# Patient Record
Sex: FEMALE | Race: WHITE | Employment: UNEMPLOYED | URBAN - METROPOLITAN AREA
[De-identification: names, ages, dates, MRNs, and addresses within clinical notes are randomized per-mention and may not be internally consistent; named-entity substitution may affect disease eponyms.]

---

## 2017-05-05 ENCOUNTER — HOSPITAL ENCOUNTER (OUTPATIENT)
Age: 62
Setting detail: OUTPATIENT SURGERY
Discharge: HOME | End: 2017-05-05
Attending: INTERNAL MEDICINE | Admitting: INTERNAL MEDICINE
Payer: COMMERCIAL

## 2017-05-05 VITALS
SYSTOLIC BLOOD PRESSURE: 107 MMHG | DIASTOLIC BLOOD PRESSURE: 71 MMHG | RESPIRATION RATE: 15 BRPM | OXYGEN SATURATION: 95 % | HEART RATE: 84 BPM

## 2017-05-05 DIAGNOSIS — J90 PLEURAL EFFUSION ON RIGHT: ICD-10-CM

## 2017-05-05 LAB
APPEARANCE FLD: NORMAL
COLOR FLD: YELLOW
FLUID COMMENTS, FCOM: NORMAL
LYMPHOCYTES NFR FLD: 44 %
MONOS+MACROS NFR FLD: 33 %
NEUTS SEG NFR FLD: 23 %
NUC CELL # FLD: 409 /CU MM
RBC # FLD: NORMAL /CU MM
SPECIMEN SOURCE FLD: NORMAL

## 2017-05-05 PROCEDURE — 84157 ASSAY OF PROTEIN OTHER: CPT | Performed by: INTERNAL MEDICINE

## 2017-05-05 PROCEDURE — 88112 CYTOPATH CELL ENHANCE TECH: CPT | Performed by: INTERNAL MEDICINE

## 2017-05-05 PROCEDURE — 87102 FUNGUS ISOLATION CULTURE: CPT | Performed by: INTERNAL MEDICINE

## 2017-05-05 PROCEDURE — 76040000019: Performed by: INTERNAL MEDICINE

## 2017-05-05 PROCEDURE — 82945 GLUCOSE OTHER FLUID: CPT | Performed by: INTERNAL MEDICINE

## 2017-05-05 PROCEDURE — 83615 LACTATE (LD) (LDH) ENZYME: CPT | Performed by: INTERNAL MEDICINE

## 2017-05-05 PROCEDURE — 87205 SMEAR GRAM STAIN: CPT | Performed by: INTERNAL MEDICINE

## 2017-05-05 PROCEDURE — 88305 TISSUE EXAM BY PATHOLOGIST: CPT | Performed by: INTERNAL MEDICINE

## 2017-05-05 PROCEDURE — 87116 MYCOBACTERIA CULTURE: CPT | Performed by: INTERNAL MEDICINE

## 2017-05-05 PROCEDURE — 89050 BODY FLUID CELL COUNT: CPT | Performed by: INTERNAL MEDICINE

## 2017-05-05 PROCEDURE — C1729 CATH, DRAINAGE: HCPCS | Performed by: INTERNAL MEDICINE

## 2017-05-05 PROCEDURE — 32555 ASPIRATE PLEURA W/ IMAGING: CPT | Performed by: INTERNAL MEDICINE

## 2017-05-05 NOTE — IP AVS SNAPSHOT
Current Discharge Medication List  
  
ASK your doctor about these medications Dose & Instructions Dispensing Information Comments Morning Noon Evening Bedtime  
 amoxicillin-clavulanate 875-125 mg per tablet Commonly known as:  AUGMENTIN Your last dose was: Your next dose is:    
   
   
 take 1 tablet by mouth twice a day for 10 days Refills:  0  
     
   
   
   
  
 escitalopram oxalate 10 mg tablet Commonly known as:  Cyndy Fatima Your last dose was: Your next dose is:    
   
   
 Dose:  10 mg Take 10 mg by mouth. Refills:  0  
     
   
   
   
  
 folic acid 1 mg tablet Commonly known as:  Google Your last dose was: Your next dose is: TAKE 1 TABLET DAILY Refills:  0  
     
   
   
   
  
 gabapentin 100 mg capsule Commonly known as:  NEURONTIN Your last dose was: Your next dose is: TAKE 2 CAPSULES TWICE A DAY Refills:  0  
     
   
   
   
  
 hydroCHLOROthiazide 25 mg tablet Commonly known as:  HYDRODIURIL Your last dose was: Your next dose is:    
   
   
 Dose:  25 mg Take 25 mg by mouth daily. Refills:  0 LORazepam 0.5 mg tablet Commonly known as:  ATIVAN Your last dose was: Your next dose is: Take  by mouth three (3) times daily as needed for Anxiety. Refills:  0  
     
   
   
   
  
 ORENCIA 125 mg/mL Syrg Generic drug:  abatacept Your last dose was: Your next dose is:    
   
   
 by SubCUTAneous route. weekly Refills:  0 PROAIR HFA 90 mcg/actuation inhaler Generic drug:  albuterol Your last dose was: Your next dose is:    
   
   
 inhale 2 puffs every 4 to 6 hours if needed for cough Refills:  0  
     
   
   
   
  
 PSEUDOEPHEDRINE-guaiFENesin  mg per tablet Commonly known as:  Saeid & Saeid D Your last dose was: Your next dose is:    
   
   
 take 1 tablet by mouth every 12 hours Refills:  0  
     
   
   
   
  
 ramipril 10 mg capsule Commonly known as:  ALTACE Your last dose was: Your next dose is:    
   
   
 Dose:  10 mg Take 10 mg by mouth daily. Refills:  0  
     
   
   
   
  
 simvastatin 40 mg tablet Commonly known as:  ZOCOR Your last dose was: Your next dose is: Take  by mouth nightly. Refills:  0  
     
   
   
   
  
 VITAMIN B-100 COMPLEX PO Your last dose was: Your next dose is: Take  by mouth daily. Refills:  0  
     
   
   
   
  
 VITAMIN D3 1,000 unit Cap Generic drug:  cholecalciferol Your last dose was: Your next dose is: Take  by mouth. Refills:  0  
     
   
   
   
  
 VITAMIN DAILY PO Your last dose was: Your next dose is: Take  by mouth. Refills:  0

## 2017-05-05 NOTE — ROUTINE PROCESS
Pt sat up on side of bed for thoracentesis. Consent obtained. Time out performed. Pts vitals monitored throughout procedure. Left and right ultrasound done and pic taken of pleural fluid.  ~600 ml yellow pleural fluid from R.  Pt tolerated procedure well with no adverse rxn. Specimens sent to the lab x 3 and labeled appropriately. Site dressed appropriately.    Lung sliding done and ultrasound findings reviewed by MD.

## 2017-05-05 NOTE — INTERVAL H&P NOTE
H&P Update:  Leata Sacks was seen and examined. History and physical has been reviewed. The patient has been examined.  There have been no significant clinical changes since the completion of the originally dated History and Physical.    Signed By: Cheryl Lloyd MD     May 5, 2017 4:26 PM

## 2017-05-05 NOTE — H&P (VIEW-ONLY)
[address] Tanna Bae. [address], [address] [facility]  [phone]    Patient Name:  Refugio Murillo  YOB: 1955    Office Visit 5/5/2017    CHIEF COMPLAINT:    Chief Complaint   Patient presents with    Abnormal CT Scan     W/I request PHP       HISTORY OF PRESENT ILLNESS:    The patient presents as a work in for increased cough for over one month. The patient is accompanied by her  who assist with history. She states the whole family was sick in December everybody else got well but she continued to cough. She states actually she was coughing around Thanksgiving also. She states she is producing mucus at times that is clear and frothy but not violent. She reports night sweats over the last 2 weeks with one time low-grade fever that she noticed. She reports intermittent left sore throat which is not hurting today. She reports intermittent sinus drainage after being outside in the wind and pollen. She reports wheezing at night that her  hears when she is sleeping. She is presently on her third round of antibiotics by her primary physician. She is on Augmentin for 10 days and is presently on day 3. She is on Mucinex twice daily. Patient was seen previously in March 2015 for history of histoplasmosis. Patient also has rheumatoid arthritis and is on Orencia. She states she has felt very bad for quite some time and is not very active at all which is unlike her. She states she usually works in the yard.       Past Medical History:   Diagnosis Date    Histoplasmosis 1991    Hypertension     Lymphedema     Major hemoptysis 1991    secondary to histoplasmosis    [facility]          Problem List  Date Reviewed: 5/5/2017          Codes Class Noted    History of histoplasmosis ICD-10-CM: Z86.19  ICD-9-CM: V12.09  3/17/2015        Obesity ICD-10-CM: E66.9  ICD-9-CM: 278.00  3/17/2015        Abnormal CT scan ICD-10-CM: R93.8  ICD-9-CM: 793.99  3/17/2015        Rheumatoid arthritis involving multiple sites with positive rheumatoid factor (HCC) (Chronic) ICD-10-CM: M05.79  ICD-9-CM: 714.0  9/15/2014    Overview Signed 9/15/2014 10:43 AM by MADIHA Barlow Dr. at [facility]             Pulmonary infiltrate ICD-10-CM: R91.8  ICD-9-CM: 793.19  9/15/2014        HTN (hypertension) (Chronic) ICD-10-CM: I10  ICD-9-CM: 401.9  2013        Congenital renal agenesis and dysgenesis ICD-10-CM: Q60.2, Q60.5  ICD-9-CM: 753.0  2013        Urinary frequency ICD-10-CM: R35.0  ICD-9-CM: 788.41  2013                Past Surgical History:   Procedure Laterality Date    HX CHOLECYSTECTOMY      HX KNEE REPLACEMENT Bilateral 2004    HX NEPHRECTOMY Right 2010    HX SHOULDER REPLACEMENT Left     HX SVT ABLATION      HX TRACHEOSTOMY         No flowsheet data found. Social History     Social History    Marital status:      Spouse name: N/A    Number of children: N/A    Years of education: N/A     Occupational History    Not on file. Social History Main Topics    Smoking status: Former Smoker     Packs/day: 0.50     Years: 3.00     Types: Cigarettes     Quit date: 1976    Smokeless tobacco: Never Used    Alcohol use Yes      Comment: Rare--approximately 4 times per year    Drug use: No    Sexual activity: Yes     Partners: Male     Other Topics Concern    Not on file     Social History Narrative    She is originally from [place], but lived in [place] from age 13-11. Has worked in [employer] previously, but currently is unemployed. Family History   Problem Relation Age of Onset    Other Mother       in childbirth    Other Father      never knew         No Known Allergies      No current outpatient prescriptions on file. No current facility-administered medications for this visit.         SUBJECTIVE:  Review of Systems:  CONSTITUTIONAL:         There is no history of weight loss, weight gain, or lethargy/hypersomnolence. ENTM:                There is no history of tinnitus, epistaxis, sore throat. Hoarseness not present. Dysphonia is not present. CARDIAC:           No chest pain, pressure, discomfort, palpitations, orthopnea, murmurs, or edema. GI:         No dysphagia, heartburn reflux, nausea/vomiting, diarrhea, abdominal pain, or bleeding. :                  There is no history of dysuria, hematuria, polyuria, or decreased urine output. SKIN:           There is no history of rashes, jaundice, cyanosis, or ulcers. PSYCH/ NEURO:          Negative for hallucinations. There is no history of AMS, persistent headache, decreased level of consciousness, seizures, or motor or sensory deficits. PHYSICAL EXAM:    Visit Vitals    /83 (BP 1 Location: Left arm, BP Patient Position: Sitting)    Pulse 79    Temp 98.3 °F (36.8 °C) (Oral)    Resp 20    Ht 5' 3.25\" (1.607 m)    Wt 212 lb 4 oz (96.3 kg)    SpO2 96%    BMI 37.3 kg/m2        GENERAL APPEARANCE:   The patient is obese and in no respiratory distress. HEENT:   PERRL. Conjunctivae unremarkable. Nasal mucosa is without epistaxis, exudate, or polyps. LUNGS:   Normal respiratory effort with symmetrical lung expansion. Breath sounds have slight wheeze with cough on deep inspiration. HEART:   There is a regular rate and rhythm. No murmur, rub, or gallop. There is no edema in lower extremities. NEURO:   The patient is alert and oriented to person, place, and time. Memory appears intact and mood is normal.  No gross sensorimotor deficits are present. DIAGNOSTIC TESTS:     CT of chest:       CT completed at THE [facility] facility in April which shows pleural effusion on the right which is increasing and increased lymphadenopathy. We will scan this report into our media system and have the CT pushed into PACS.         Exercise oximetry:      Initial oxygen saturation at rest on room air using temporal had been probe 99% which dropped to 93% back up to 95% with extended exertion; heart rate ; patient walked for approximately 200 yards. Ultrasound right posterior chest wall, 5/5/17:              ASSESSMENT:       1. Abnormal CT scan, outside CT obtained by primary physician is increasing lymphadenopathy and pleural effusion. This report will be scanned in the media and CT will be pushed into  PACS for viewing to compared to previous CT from 2015. .   2. Rheumatoid arthritis involving multiple sites with positive rheumatoid factor ([address] followed by rheumatology, she is on Orencia. 3. History of histoplasmosis, known history. 4. Pleural effusion on right, see above. 5. Shortness of breath, intermittent but not severe, walking oximetry shows adequate oxygenation. PLAN:  The patient will continue present medication including Augmentin. We will send the patient to the bronchoscopy lab for an ultrasound and possible thoracentesis if indicated. Today's ultrasound in the office shows some fluid, but, may not be enough to tap. We will have patient CT from [facility] pushed into the PACS system for review and comparison to previous CT from 2015. Today's walking oximetry is reviewed with patient which shows adequate oxygenation. At this time the patient will keep her follow-up in June as planned but will be seen sooner if needed. Supervising physician: Dr. Margy Cohen. Over 50% of today's office visit was spent in face to face time reviewing test results, prognosis, importance of compliance, education about disease process, benefits of medications, instructions for management of acute flare-ups, and follow up plans. Total face to face time spent with patient was 25 minutes. Julissa Ferrer NP    Electronically Signed    Dictated using voice recognition software.   Proof read but unrecognized errors may exist.

## 2017-05-05 NOTE — IP AVS SNAPSHOT
Harry Asiya 
 
 
 [facility] [address] [facility] 
[phone] Patient: Gregorio Sampson MRN: [id number] You are allergic to the following No active allergies Recent Documentation Breastfeeding? OB Status Smoking Status No Hysterectomy Former Smoker Emergency Contacts Name Discharge Info Relation Home Work Mobile Manjeet Byrnes  Spouse [3] [phone] About your hospitalization You were admitted on: May 5, 2017 You last received care in the:  [facility] You were discharged on: May 5, 2017 Unit phone number:  [phone] Why you were hospitalized Your primary diagnosis was:  Not on File Providers Seen During Your Hospitalizations Provider Role Specialty Primary office phone Shira Philip MD Attending Provider Pulmonary Disease [phone] Your Primary Care Physician (PCP) Primary Care Physician Office Phone Office Fax [address], [address] [phone] Follow-up Information None Your Appointments Wednesday June 14, 2017  2:00 PM EDT Return appointment with MADIHA Rivera [facility] ([facility]) [address] [place] [address] [address]  
[phone] Current Discharge Medication List  
  
ASK your doctor about these medications Dose & Instructions Dispensing Information Comments Morning Noon Evening Bedtime  
 amoxicillin-clavulanate 875-125 mg per tablet Commonly known as:  AUGMENTIN Your last dose was: Your next dose is:    
   
   
 take 1 tablet by mouth twice a day for 10 days Refills:  0  
     
   
   
   
  
 escitalopram oxalate 10 mg tablet Commonly known as:  Shyam Arroyo Your last dose was: Your next dose is:    
   
   
 Dose:  10 mg Take 10 mg by mouth. Refills:  0  
     
   
   
   
  
 folic acid 1 mg tablet Commonly known as:  Google Your last dose was: Your next dose is: TAKE 1 TABLET DAILY Refills:  0  
     
   
   
   
  
 gabapentin 100 mg capsule Commonly known as:  NEURONTIN Your last dose was: Your next dose is: TAKE 2 CAPSULES TWICE A DAY Refills:  0  
     
   
   
   
  
 hydroCHLOROthiazide 25 mg tablet Commonly known as:  HYDRODIURIL Your last dose was: Your next dose is:    
   
   
 Dose:  25 mg Take 25 mg by mouth daily. Refills:  0 LORazepam 0.5 mg tablet Commonly known as:  ATIVAN Your last dose was: Your next dose is: Take  by mouth three (3) times daily as needed for Anxiety. Refills:  0  
     
   
   
   
  
 ORENCIA 125 mg/mL Syrg Generic drug:  abatacept Your last dose was: Your next dose is:    
   
   
 by SubCUTAneous route. weekly Refills:  0 PROAIR HFA 90 mcg/actuation inhaler Generic drug:  albuterol Your last dose was: Your next dose is:    
   
   
 inhale 2 puffs every 4 to 6 hours if needed for cough Refills:  0  
     
   
   
   
  
 PSEUDOEPHEDRINE-guaiFENesin  mg per tablet Commonly known as:  Saeid & Saeid D Your last dose was: Your next dose is:    
   
   
 take 1 tablet by mouth every 12 hours Refills:  0  
     
   
   
   
  
 ramipril 10 mg capsule Commonly known as:  ALTACE Your last dose was: Your next dose is:    
   
   
 Dose:  10 mg Take 10 mg by mouth daily. Refills:  0  
     
   
   
   
  
 simvastatin 40 mg tablet Commonly known as:  ZOCOR Your last dose was: Your next dose is: Take  by mouth nightly. Refills:  0  
     
   
   
   
  
 VITAMIN B-100 COMPLEX PO Your last dose was: Your next dose is: Take  by mouth daily. Refills:  0  
     
   
   
   
  
 VITAMIN D3 1,000 unit Cap Generic drug:  cholecalciferol Your last dose was: Your next dose is: Take  by mouth. Refills:  0  
     
   
   
   
  
 VITAMIN DAILY PO Your last dose was: Your next dose is: Take  by mouth. Refills:  0 Discharge Instructions Thoracentesis: What to Expect at [facility] Your Recovery Thoracentesis (say \"pqcr-ib-ect-ANALISA-sis\") is a procedure to remove fluid from the space between the lungs and the chest wall (pleural space). This procedure may also be called a \"chest tap. \" It is normal to have a small amount of fluid in the pleural space. But too much fluid can build up because of problems such as infection, heart failure, or lung cancer. The procedure may have been done to help with shortness of breath and pain caused by the fluid buildup. Or you may have had this procedure so the doctor could test the fluid to find the cause of the buildup. Your chest may be sore where the doctor put the needle or catheter into your skin (the puncture site). This usually gets better after a day or two. You can go back to work or your normal activities as soon as you feel up to it. If the doctor sent the fluid to a lab for testing, it may take several days to get the results. The doctor or nurse will discuss the results with you. This care sheet gives you a general idea about how long it will take for you to recover. But each person recovers at a different pace. Follow the steps below to feel better as quickly as possible. How can you care for yourself at home? Activity · Rest when you feel tired. Getting enough sleep will help you recover. · Avoid strenuous activities, such as bicycle riding, jogging, weight lifting, or aerobic exercise, until your doctor says it is okay. · You may shower. Do not take a bath until the puncture site has healed, or until your doctor tells you it is okay. · Ask your doctor when you can drive again. · You may need to take 1 or 2 days off from work. It depends on the type of work you do and how you feel. Diet · You can eat your normal diet. · Drink plenty of fluids (unless your doctor tells you not to). Medicines · Your doctor will tell you if and when you can restart your medicines. He or she will also give you instructions about taking any new medicines. · If you take blood thinners, such as warfarin (Coumadin), clopidogrel (Plavix), or aspirin, be sure to talk to your doctor. He or she will tell you if and when to start taking those medicines again. Make sure that you understand exactly what your doctor wants you to do. · Be safe with medicines. Take pain medicines exactly as directed. ¨ If the doctor gave you a prescription medicine for pain, take it as prescribed. ¨ If you are not taking a prescription pain medicine, ask your doctor if you can take an over-the-counter medicine. ¨ Do not take two or more pain medicines at the same time unless the doctor told you to. Many pain medicines have acetaminophen, which is Tylenol. Too much acetaminophen (Tylenol) can be harmful. · If you think your pain medicine is making you sick to your stomach: 
¨ Take your medicine after meals (unless your doctor has told you not to). ¨ Ask your doctor for a different pain medicine. · If your doctor prescribed antibiotics, take them as directed. Do not stop taking them just because you feel better. You need to take the full course of antibiotics. Care of the puncture site · Wash the area daily with warm, soapy water, and pat it dry. Don't use hydrogen peroxide or alcohol, which may delay healing. You may cover the area with a gauze bandage if it weeps or rubs against clothing. Change the bandage every day. · Keep the area clean and dry. Follow-up care is a key part of your treatment and safety. Be sure to make and go to all appointments, and call your doctor if you are having problems. It's also a good idea to know your test results and keep a list of the medicines you take. When should you call for help? Call 911 anytime you think you may need emergency care. For example, call if: 
· You passed out (lost consciousness). · You have severe trouble breathing. · You have sudden chest pain and shortness of breath, or you cough up blood. Call your doctor now or seek immediate medical care if: 
· You have shortness of breath that is new or getting worse. · You have new or worse pain in your chest, especially when you take a deep breath. · You are sick to your stomach or cannot keep fluids down. · You have a fever over 100°F. 
· Bright red blood has soaked through the bandage over your puncture site. · You have signs of infection, such as: 
¨ Increased pain, swelling, warmth, or redness. ¨ Red streaks leading from the puncture site. ¨ Pus draining from the puncture site. ¨ Swollen lymph nodes in your neck, armpits, or groin. ¨ A fever. · You cough up a lot more mucus than normal, or your mucus changes color. Watch closely for changes in your health, and be sure to contact your doctor if you have any problems. Where can you learn more? Go to http://adriaan-juanito.info/. Enter Q921 in the search box to learn more about \"Thoracentesis: What to Expect at Home. \" Current as of: May 23, 2016 Content Version: 11.2 © 0283-3822 [employer]. Care instructions adapted under license by [employer] (which disclaims liability or warranty for this information). If you have questions about a medical condition or this instruction, always ask your healthcare professional. [address] any warranty or liability for your use of this information. Call Dr Jessy Jiang or Olive Hampton on Wednesday of next week at [phone] for results of pleural fluid. Keep follow up appointment as instructed. Discharge Orders None Introducing [facility] SERVICES! Melissa Charles introduces [employer] patient portal. Now you can access parts of your medical record, email your doctor's office, and request medication refills online. 1. In your internet browser, go to [email or web address] 2. Click on the First Time User? Click Here link in the Sign In box. You will see the New Member Sign Up page. 3. Enter your [employer] Access Code exactly as it appears below. You will not need to use this code after youve completed the sign-up process. If you do not sign up before the expiration date, you must request a new code. · [employer] Access Code: [email or web address] Expires: 8/3/2017  1:18 PM 
 
4. Enter the last four digits of your Social Security Number (xxxx) and Date of Birth (mm/dd/yyyy) as indicated and click Submit. You will be taken to the next sign-up page. 5. Create a [employer] ID. This will be your [employer] login ID and cannot be changed, so think of one that is secure and easy to remember. 6. Create a [employer] password. You can change your password at any time. 7. Enter your Password Reset Question and Answer. This can be used at a later time if you forget your password. 8. Enter your e-mail address. You will receive e-mail notification when new information is available in [address] 9. Click Sign Up. You can now view and download portions of your medical record. 10. Click the Download Summary menu link to download a portable copy of your medical information. If you have questions, please visit the Frequently Asked Questions section of the [employer] website. Remember, [employer] is NOT to be used for urgent needs. For medical emergencies, dial 911. Now available from your iPhone and Android! General Information Please provide this summary of care documentation to your next provider. Patient Signature:  ____________________________________________________________ Date:  ____________________________________________________________  
  
Diaz Ledger Provider Signature:  ____________________________________________________________ Date:  ____________________________________________________________

## 2017-05-05 NOTE — DISCHARGE INSTRUCTIONS
Thoracentesis: What to Expect at Home  Your Recovery  Thoracentesis (say \"xvzn-ws-weq-ANALISA-sis\") is a procedure to remove fluid from the space between the lungs and the chest wall (pleural space). This procedure may also be called a \"chest tap. \" It is normal to have a small amount of fluid in the pleural space. But too much fluid can build up because of problems such as infection, heart failure, or lung cancer. The procedure may have been done to help with shortness of breath and pain caused by the fluid buildup. Or you may have had this procedure so the doctor could test the fluid to find the cause of the buildup. Your chest may be sore where the doctor put the needle or catheter into your skin (the puncture site). This usually gets better after a day or two. You can go back to work or your normal activities as soon as you feel up to it. If the doctor sent the fluid to a lab for testing, it may take several days to get the results. The doctor or nurse will discuss the results with you. This care sheet gives you a general idea about how long it will take for you to recover. But each person recovers at a different pace. Follow the steps below to feel better as quickly as possible. How can you care for yourself at home? Activity  · Rest when you feel tired. Getting enough sleep will help you recover. · Avoid strenuous activities, such as bicycle riding, jogging, weight lifting, or aerobic exercise, until your doctor says it is okay. · You may shower. Do not take a bath until the puncture site has healed, or until your doctor tells you it is okay. · Ask your doctor when you can drive again. · You may need to take 1 or 2 days off from work. It depends on the type of work you do and how you feel. Diet  · You can eat your normal diet. · Drink plenty of fluids (unless your doctor tells you not to). Medicines  · Your doctor will tell you if and when you can restart your medicines.  He or she will also give you instructions about taking any new medicines. · If you take blood thinners, such as warfarin (Coumadin), clopidogrel (Plavix), or aspirin, be sure to talk to your doctor. He or she will tell you if and when to start taking those medicines again. Make sure that you understand exactly what your doctor wants you to do. · Be safe with medicines. Take pain medicines exactly as directed. ¨ If the doctor gave you a prescription medicine for pain, take it as prescribed. ¨ If you are not taking a prescription pain medicine, ask your doctor if you can take an over-the-counter medicine. ¨ Do not take two or more pain medicines at the same time unless the doctor told you to. Many pain medicines have acetaminophen, which is Tylenol. Too much acetaminophen (Tylenol) can be harmful. · If you think your pain medicine is making you sick to your stomach:  ¨ Take your medicine after meals (unless your doctor has told you not to). ¨ Ask your doctor for a different pain medicine. · If your doctor prescribed antibiotics, take them as directed. Do not stop taking them just because you feel better. You need to take the full course of antibiotics. Care of the puncture site  · Wash the area daily with warm, soapy water, and pat it dry. Don't use hydrogen peroxide or alcohol, which may delay healing. You may cover the area with a gauze bandage if it weeps or rubs against clothing. Change the bandage every day. · Keep the area clean and dry. Follow-up care is a key part of your treatment and safety. Be sure to make and go to all appointments, and call your doctor if you are having problems. It's also a good idea to know your test results and keep a list of the medicines you take. When should you call for help? Call 911 anytime you think you may need emergency care. For example, call if:  · You passed out (lost consciousness). · You have severe trouble breathing.   · You have sudden chest pain and shortness of breath, or you cough up blood. Call your doctor now or seek immediate medical care if:  · You have shortness of breath that is new or getting worse. · You have new or worse pain in your chest, especially when you take a deep breath. · You are sick to your stomach or cannot keep fluids down. · You have a fever over 100°F.  · Bright red blood has soaked through the bandage over your puncture site. · You have signs of infection, such as:  ¨ Increased pain, swelling, warmth, or redness. ¨ Red streaks leading from the puncture site. ¨ Pus draining from the puncture site. ¨ Swollen lymph nodes in your neck, armpits, or groin. ¨ A fever. · You cough up a lot more mucus than normal, or your mucus changes color. Watch closely for changes in your health, and be sure to contact your doctor if you have any problems. Where can you learn more? Go to http://adriana-juanito.info/. Enter I852 in the search box to learn more about \"Thoracentesis: What to Expect at Home. \"  Current as of: May 23, 2016  Content Version: 11.2  © 2717-1496 [employer]. Care instructions adapted under license by [employer] (which disclaims liability or warranty for this information). If you have questions about a medical condition or this instruction, always ask your healthcare professional. [place] [address] any warranty or liability for your use of this information. Call Dr Yahaira Fried or Joselyn Sullivan on Wednesday of next week at [phone] for results of pleural fluid. Keep follow up appointment as instructed.

## 2017-05-05 NOTE — PROCEDURES
Pre PROCEDURE DX :    DIAGNOSTIC/THERAPEUTIC THORACENTESIS. INDICATION/ Site:    PLEURAL EFFUSION --- right:    POST PROCEDURE DX : yellowish effusion    Patient identification: done by Tegan Liu ( RT ), Dr. Gutierrez Simon. All agreed    ANESTHESIA:    LOCAL ANESTHESIA WITH 1% LIDOCAINE 10CC TOTAL. CHEST ULTRASOUND FINDINGS:    A Turbo-M, Sonosite ultrasound with a 5-16 mHz probe was used to image the chest and localize the pleural effusion on the right  chest.    A moderate anechoic space was seen on the right consistent with an uncomplicated pleural effusion. See image for review. DESCRIPTION OF PROCEDURE:    After obtaining informed consent and localizing the safest location for thoracentesis, the  space was marked with a blunt, plastic needle cap in the mid scapular line. An [employer] AK-0100 Pleral-Seal thoracentesis kit was used to perform the procedure. The skin was  cleansed with the supplied  chlorhexididne swab and then draped in the usual fasion. Using the previously marked location as a giude, a 22 G 1.5 inch needle was used to inject 10 cc of 1% lidocaine into the skin and subcutaneous tissue, as well as onto the underlying rib and inter-costal muscles, pleural fluid was aspirated to assure proper location, prior to removing the anesthesia needle. A 3mm  incision was then made, with the supplied scalpel in the usual fashion to facilitate the insertiopn of the thoracentesis needle. The needle with an 8French thoracentesis catheter was then introduced into the chest through the previously made incision in the usual fashio,. The rib licalized with the needle, and the catheter then marched over the rib into the pleural space. After aspirating fluid, the thoracentesis catheter was then placed into the chest using the needle itself as a trocar. The needle was then removed and the catheter was attached to the supplied tubing without complication.     A total of 600 cc of yellow  fluid was removed without complication. Lung slide was performed and revealed no PTX. The patient was stable post procedure.      EBL : 1 ml    STUDIES ORDERED:   Protein, glucose, LDH   Cell count and differential   Routine C&S   AFB smear and cultures   Fungal smear and cultures   Cytology    Arlette Ponce MD

## 2017-05-06 LAB
GLUCOSE FLD-MCNC: 104 MG/DL
LDH FLD L TO P-CCNC: 118 U/L
PROT FLD-MCNC: 4.2 G/DL
SPECIMEN SOURCE FLD: NORMAL

## 2017-05-08 LAB
BACTERIA SPEC CULT: NORMAL
GRAM STN SPEC: NORMAL
GRAM STN SPEC: NORMAL
SERVICE CMNT-IMP: NORMAL

## 2017-06-06 LAB
FUNGUS CULTURE, RFCO2T: NEGATIVE
FUNGUS SMEAR, RFCO1T: NORMAL
FUNGUS SPEC CULT: NORMAL
FUNGUS STAIN, 188244: NORMAL
REFLEX TO ID, RFCO3T: NORMAL
SPECIMEN SOURCE: NORMAL
SPECIMEN SOURCE: NORMAL

## 2017-06-09 ENCOUNTER — HOSPITAL ENCOUNTER (OUTPATIENT)
Dept: GENERAL RADIOLOGY | Age: 62
Discharge: HOME | End: 2017-06-09
Payer: COMMERCIAL

## 2017-06-09 DIAGNOSIS — R91.8 PULMONARY INFILTRATE: ICD-10-CM

## 2017-06-09 DIAGNOSIS — R93.89 ABNORMAL CT SCAN: ICD-10-CM

## 2017-06-09 PROCEDURE — 71020 XR CHEST PA LAT: CPT

## 2017-06-20 LAB
ACID FAST STN SPEC: NEGATIVE
MYCOBACTERIUM SPEC QL CULT: NEGATIVE
SPECIMEN PREPARATION: NORMAL
SPECIMEN SOURCE: NORMAL

## 2017-10-16 ENCOUNTER — APPOINTMENT (RX ONLY)
Dept: URBAN - METROPOLITAN AREA CLINIC 23 | Facility: CLINIC | Age: 62
Setting detail: DERMATOLOGY
End: 2017-10-16

## 2017-10-16 DIAGNOSIS — L57.0 ACTINIC KERATOSIS: ICD-10-CM

## 2017-10-16 DIAGNOSIS — L82.1 OTHER SEBORRHEIC KERATOSIS: ICD-10-CM

## 2017-10-16 DIAGNOSIS — D22 MELANOCYTIC NEVI: ICD-10-CM

## 2017-10-16 DIAGNOSIS — L81.4 OTHER MELANIN HYPERPIGMENTATION: ICD-10-CM

## 2017-10-16 DIAGNOSIS — L73.8 OTHER SPECIFIED FOLLICULAR DISORDERS: ICD-10-CM

## 2017-10-16 PROBLEM — D22.4 MELANOCYTIC NEVI OF SCALP AND NECK: Status: ACTIVE | Noted: 2017-10-16

## 2017-10-16 PROBLEM — D48.5 NEOPLASM OF UNCERTAIN BEHAVIOR OF SKIN: Status: ACTIVE | Noted: 2017-10-16

## 2017-10-16 PROBLEM — L29.8 OTHER PRURITUS: Status: ACTIVE | Noted: 2017-10-16

## 2017-10-16 PROBLEM — M12.9 ARTHROPATHY, UNSPECIFIED: Status: ACTIVE | Noted: 2017-10-16

## 2017-10-16 PROBLEM — D22.5 MELANOCYTIC NEVI OF TRUNK: Status: ACTIVE | Noted: 2017-10-16

## 2017-10-16 PROCEDURE — 11310 SHAVE SKIN LESION 0.5 CM/<: CPT | Mod: 59

## 2017-10-16 PROCEDURE — ? SHAVE REMOVAL

## 2017-10-16 PROCEDURE — 99203 OFFICE O/P NEW LOW 30 MIN: CPT | Mod: 25

## 2017-10-16 PROCEDURE — ? COUNSELING

## 2017-10-16 PROCEDURE — 17003 DESTRUCT PREMALG LES 2-14: CPT

## 2017-10-16 PROCEDURE — 17000 DESTRUCT PREMALG LESION: CPT

## 2017-10-16 PROCEDURE — ? LIQUID NITROGEN

## 2017-10-16 ASSESSMENT — LOCATION SIMPLE DESCRIPTION DERM
LOCATION SIMPLE: RIGHT UPPER BACK
LOCATION SIMPLE: LEFT FOREHEAD
LOCATION SIMPLE: RIGHT CHEEK
LOCATION SIMPLE: ABDOMEN
LOCATION SIMPLE: RIGHT SHOULDER
LOCATION SIMPLE: RIGHT CALF
LOCATION SIMPLE: LEFT SHOULDER
LOCATION SIMPLE: CHEST
LOCATION SIMPLE: POSTERIOR SCALP
LOCATION SIMPLE: LEFT CALF

## 2017-10-16 ASSESSMENT — LOCATION DETAILED DESCRIPTION DERM
LOCATION DETAILED: RIGHT INFERIOR MEDIAL UPPER BACK
LOCATION DETAILED: RIGHT SUPERIOR CENTRAL BUCCAL CHEEK
LOCATION DETAILED: LEFT POSTERIOR SHOULDER
LOCATION DETAILED: RIGHT PROXIMAL CALF
LOCATION DETAILED: LEFT INFERIOR OCCIPITAL SCALP
LOCATION DETAILED: RIGHT INFERIOR UPPER BACK
LOCATION DETAILED: RIGHT MEDIAL SUPERIOR CHEST
LOCATION DETAILED: LEFT PROXIMAL CALF
LOCATION DETAILED: RIGHT POSTERIOR SHOULDER
LOCATION DETAILED: EPIGASTRIC SKIN
LOCATION DETAILED: LEFT MEDIAL FOREHEAD

## 2017-10-16 ASSESSMENT — LOCATION ZONE DERM
LOCATION ZONE: ARM
LOCATION ZONE: TRUNK
LOCATION ZONE: FACE
LOCATION ZONE: LEG
LOCATION ZONE: SCALP

## 2017-10-16 NOTE — PROCEDURE: SHAVE REMOVAL
X Size Of Lesion In Cm (Optional): 0
Anesthesia Type: 1% lidocaine with epinephrine
Notification Instructions: Patient will be notified of biopsy results. However, patient instructed to call the office if not contacted within 2 weeks.
Detail Level: Detailed
Post-Care Instructions: I reviewed with the patient in detail post-care instructions. Patient is to keep the biopsy site dry overnight, and then apply Vaseline and bandaid daily until healed. Patient may apply diluted hydrogen peroxide soaks to remove any crusting.
Consent was obtained from the patient. The risks and benefits to therapy were discussed in detail. Specifically, the risks of infection, scarring, bleeding, prolonged wound healing, incomplete removal, allergy to anesthesia, nerve injury and recurrence were addressed. Prior to the procedure, the treatment site was clearly identified and confirmed by the patient. All components of Universal Protocol/PAUSE Rule completed.
Size Of Lesion In Cm (Required): 0.3
Bill 73791 For Specimen Handling/Conveyance To Laboratory?: no
Path Notes (To The Dermatopathologist): Please check margins.
Medical Necessity Clause: This procedure was medically necessary because the lesion that was treated was:
Billing Type: Third-Party Bill
Hemostasis: Aluminum Chloride
Medical Necessity Information: It is in your best interest to select a reason for this procedure from the list below. All of these items fulfill various CMS LCD requirements except the new and changing color options.
Biopsy Method: Dermablade
Size Of Margin In Cm (Margins Are Not Added To Billing Dimensions): -
Accession #: [id number]
Wound Care: Vaseline

## 2022-03-19 PROBLEM — J90 PLEURAL EFFUSION ON RIGHT: Status: ACTIVE | Noted: 2017-05-05

## 2025-04-19 ENCOUNTER — TELEPHONE (OUTPATIENT)
Dept: ORTHOPEDIC SURGERY | Age: 70
End: 2025-04-19

## 2025-04-19 RX ORDER — PREDNISONE 5 MG/1
5 TABLET ORAL DAILY
Qty: 10 TABLET | Refills: 0 | Status: SHIPPED | OUTPATIENT
Start: 2025-04-19 | End: 2025-04-29

## 2025-04-21 NOTE — TELEPHONE ENCOUNTER
Department of Anesthesiology  Preprocedure Note       Name:  Kareen Burns   Age:  82 y.o.  :  1932                                          MRN:  [id number]         Date:  2021      Surgeon: lisa    Procedure: SARAHV    Medications prior to admission:   Prior to Admission medications    Medication Sig Start Date End Date Taking? Authorizing Provider   citalopram (CELEXA) 20 MG tablet Take 1 tablet by mouth daily 21  Yes Alireza Brenner DO   amiodarone (PACERONE) 400 MG tablet Take 1 tablet by mouth daily 21  Yes nAahi Marcum MD   levothyroxine (SYNTHROID) 125 MCG tablet 125 mcg Daily  1/15/21  Yes Historical Provider, MD   dilTIAZem (CARDIZEM CD) 180 MG extended release capsule TAKE 1 CAPSULE BY MOUTH TWICE A DAY  Patient taking differently: Take 180 mg by mouth 2 times daily TAKE 1 CAPSULE BY MOUTH TWICE A DAY  TAKES 2 ONCE A DAY 6/10/21 2/11/21  Yes Anahi Marcum MD   XARELTO 20 MG TABS tablet TAKE 1 TABLET BY MOUTH EVERY DAY 20  Yes Anahi Marcum MD   simvastatin (ZOCOR) 40 MG tablet Take 40 mg by mouth nightly  3/29/18  Yes Historical Provider, MD   vitamin B-12 (CYANOCOBALAMIN) 1000 MCG tablet Take 6,000 mcg by mouth daily    Yes Historical Provider, MD   albuterol (PROVENTIL HFA) 108 (90 BASE) MCG/ACT inhaler Inhale 2 puffs into the lungs every 4 hours as needed for Wheezing or Shortness of Breath. 14  Yes Sae Dennis MD   Cholecalciferol (VITAMIN D3) 2000 units TABS Take 2 tablets by mouth daily    Yes Historical Provider, MD   Biotin 5000 MCG CAPS Take 1 capsule by mouth daily. Yes Historical Provider, MD   Vitamin E 400 UNIT TABS Take 1 tablet by mouth daily.      Yes Historical Provider, MD       Current medications:    Current Outpatient Medications   Medication Sig Dispense Refill    citalopram (CELEXA) 20 MG tablet Take 1 tablet by mouth daily 30 tablet 3    amiodarone (PACERONE) 400 MG tablet Take 1 tablet by mouth daily 30 tablet 3    levothyroxine Med refill   (SYNTHROID) 125 MCG tablet 125 mcg Daily       dilTIAZem (CARDIZEM CD) 180 MG extended release capsule TAKE 1 CAPSULE BY MOUTH TWICE A DAY (Patient taking differently: Take 180 mg by mouth 2 times daily TAKE 1 CAPSULE BY MOUTH TWICE A DAY  TAKES 2 ONCE A DAY 6/10/21) 180 capsule 1    XARELTO 20 MG TABS tablet TAKE 1 TABLET BY MOUTH EVERY DAY 90 tablet 1    simvastatin (ZOCOR) 40 MG tablet Take 40 mg by mouth nightly   3    vitamin B-12 (CYANOCOBALAMIN) 1000 MCG tablet Take 6,000 mcg by mouth daily       albuterol (PROVENTIL HFA) 108 (90 BASE) MCG/ACT inhaler Inhale 2 puffs into the lungs every 4 hours as needed for Wheezing or Shortness of Breath. 1 Inhaler 0    Cholecalciferol (VITAMIN D3) 2000 units TABS Take 2 tablets by mouth daily       Biotin 5000 MCG CAPS Take 1 capsule by mouth daily.  Vitamin E 400 UNIT TABS Take 1 tablet by mouth daily. No current facility-administered medications for this encounter. Allergies:     Allergies   Allergen Reactions    Codeine Nausea And Vomiting    Codimal-A [Brompheniramine] Other (See Comments)     Altered mental state and confusion    Lipitor Other (See Comments)     GENERALIZED ACHING, DIFFICULT TO FUNCTION    Spiriva Handihaler [Tiotropium Bromide Monohydrate]     Tudorza Pressair [Aclidinium Bromide]     Ciprofloxacin Nausea And Vomiting       Problem List:    Patient Active Problem List   Diagnosis Code    Persistent atrial fibrillation (HCC) I48.19    History of nuclear stress test Z92.89    Hyperlipidemia E78.5    Depression F32.9    Asthma J45.909    Vitamin D deficiency E55.9       Past Medical History:        Diagnosis Date    Asthma     Depression     Hyperlipidemia     Thyroid disease        Past Surgical History:        Procedure Laterality Date    APPENDECTOMY  62 YEARS AGO    CARDIOVERSION  09/20/2019    Dr. Matias Thomas 1 shock 200 joules Successful    CHOLECYSTECTOMY      EYE SURGERY      CATARACT RIGHT AND LEFT    HYSTERECTOMY      NECK SURGERY  2011    THYROIDECTOMY  17 YEARS AGO    TONSILLECTOMY         Social History:    Social History     Tobacco Use    Smoking status: Former Smoker     Types: Cigarettes     Quit date: 1994     Years since quittin.8    Smokeless tobacco: Never Used   Substance Use Topics    Alcohol use: No     Comment: RARE                                Counseling given: Not Answered      Vital Signs (Current):   Vitals:    21 0830   BP: 111/84   Pulse: 106   Resp: 16   Temp: 36.8 °C (98.2 °F)   SpO2: 93%   Weight: 190 lb (86.2 kg)   Height: 5' 8\" (1.727 m)                                              BP Readings from Last 3 Encounters:   21 111/84   21 113/79   21 96/62       NPO Status:  >8 hrs                                                                               BMI:   Wt Readings from Last 3 Encounters:   21 190 lb (86.2 kg)   21 194 lb (88 kg)   21 194 lb 6.4 oz (88.2 kg)     Body mass index is 28.89 kg/m². CBC:   Lab Results   Component Value Date    WBC 9.1 2021    RBC 5.23 2021    HGB 13.0 2021    HCT 43.6 2021    MCV 83.4 2021    RDW 17.0 2021     2021       CMP:   Lab Results   Component Value Date     2021    K 4.2 2021    K 4.1 2020     2021    CO2 25 2021    BUN 21 2021    CREATININE 1.0 2021    GFRAA >60 2021    LABGLOM 52 2021    GLUCOSE 114 2021    PROT 6.0 2020    CALCIUM 9.4 2021    BILITOT 0.5 2020    ALKPHOS 64 2020    AST 20 2020    ALT 11 2020       POC Tests: No results for input(s): POCGLU, POCNA, POCK, POCCL, POCBUN, POCHEMO, POCHCT in the last 72 hours.     Coags:   Lab Results   Component Value Date    PROTIME 17.0 2020    INR 1.5 2020    APTT 34.6 2020       HCG (If Applicable): No results found for: PREGTESTUR, PREGSERUM, HCG, HCGQUANT     ABGs: No results found for: PHART, PO2ART, NTY5UEM, EEI8ILO, BEART, Q8NGICHQ     Type & Screen (If Applicable):  No results found for: LABABO, LABRH    Drug/Infectious Status (If Applicable):  No results found for: HIV, HEPCAB    COVID-19 Screening (If Applicable):   Lab Results   Component Value Date    COVID19 Not Detected 07/28/2021           Anesthesia Evaluation    Airway: Mallampati: II  TM distance: >3 FB   Neck ROM: full  Mouth opening: > = 3 FB Dental: normal exam         Pulmonary: breath sounds clear to auscultation  (+) asthma:                            Cardiovascular:    (+) dysrhythmias:,         Rhythm: irregular  Rate: normal                    Neuro/Psych:   (+) psychiatric history: stable with treatmentdepression/anxiety             GI/Hepatic/Renal:             Endo/Other:                     Abdominal:       Abdomen: soft. Vascular: Other Findings:             Anesthesia Plan      MAC     ASA 3       Induction: intravenous. Anesthetic plan and risks discussed with patient. Use of blood products discussed with patient whom. Plan discussed with attending.                   SONY Olsen - ALFONSO   8/2/2021

## (undated) DEVICE — KIT THORCENT 8FR L5IN POLYUR W/ 18/22/25GA NDL 3 W STPCOCK

## (undated) DEVICE — STERILE POLYISOPRENE POWDER-FREE SURGICAL GLOVES: Brand: PROTEXIS